# Patient Record
Sex: MALE | Race: BLACK OR AFRICAN AMERICAN | NOT HISPANIC OR LATINO | Employment: FULL TIME | URBAN - METROPOLITAN AREA
[De-identification: names, ages, dates, MRNs, and addresses within clinical notes are randomized per-mention and may not be internally consistent; named-entity substitution may affect disease eponyms.]

---

## 2021-07-05 ENCOUNTER — HOSPITAL ENCOUNTER (EMERGENCY)
Facility: HOSPITAL | Age: 21
Discharge: HOME | End: 2021-07-05
Attending: EMERGENCY MEDICINE
Payer: OTHER GOVERNMENT

## 2021-07-05 VITALS
BODY MASS INDEX: 22.4 KG/M2 | DIASTOLIC BLOOD PRESSURE: 84 MMHG | HEIGHT: 71 IN | WEIGHT: 160 LBS | SYSTOLIC BLOOD PRESSURE: 140 MMHG | RESPIRATION RATE: 18 BRPM | HEART RATE: 60 BPM | TEMPERATURE: 99 F | OXYGEN SATURATION: 100 %

## 2021-07-05 DIAGNOSIS — U07.1 COVID-19: Primary | ICD-10-CM

## 2021-07-05 DIAGNOSIS — R05.9 COUGH: ICD-10-CM

## 2021-07-05 LAB — SARS-COV-2 RDRP RESP QL NAA+PROBE: POSITIVE

## 2021-07-05 PROCEDURE — 99283 EMERGENCY DEPT VISIT LOW MDM: CPT

## 2021-07-05 PROCEDURE — U0002 COVID-19 LAB TEST NON-CDC: HCPCS | Performed by: NURSE PRACTITIONER

## 2021-07-23 ENCOUNTER — HOSPITAL ENCOUNTER (EMERGENCY)
Facility: HOSPITAL | Age: 21
Discharge: HOME | End: 2021-07-23
Attending: EMERGENCY MEDICINE
Payer: OTHER GOVERNMENT

## 2021-07-23 VITALS
RESPIRATION RATE: 17 BRPM | OXYGEN SATURATION: 100 % | TEMPERATURE: 98 F | HEART RATE: 64 BPM | DIASTOLIC BLOOD PRESSURE: 90 MMHG | SYSTOLIC BLOOD PRESSURE: 146 MMHG

## 2021-07-23 DIAGNOSIS — U07.1 LAB TEST POSITIVE FOR DETECTION OF COVID-19 VIRUS: Primary | ICD-10-CM

## 2021-07-23 LAB — SARS-COV-2 RDRP RESP QL NAA+PROBE: POSITIVE

## 2021-07-23 PROCEDURE — U0002 COVID-19 LAB TEST NON-CDC: HCPCS | Performed by: NURSE PRACTITIONER

## 2021-07-23 PROCEDURE — 99282 EMERGENCY DEPT VISIT SF MDM: CPT

## 2023-02-06 ENCOUNTER — HOSPITAL ENCOUNTER (EMERGENCY)
Facility: OTHER | Age: 23
Discharge: HOME | End: 2023-02-06
Attending: EMERGENCY MEDICINE

## 2023-02-06 VITALS
HEART RATE: 58 BPM | TEMPERATURE: 98 F | DIASTOLIC BLOOD PRESSURE: 73 MMHG | SYSTOLIC BLOOD PRESSURE: 128 MMHG | HEIGHT: 71 IN | RESPIRATION RATE: 18 BRPM | WEIGHT: 160 LBS | OXYGEN SATURATION: 98 % | BODY MASS INDEX: 22.4 KG/M2

## 2023-02-06 DIAGNOSIS — S39.012A LUMBAR STRAIN, INITIAL ENCOUNTER: Primary | ICD-10-CM

## 2023-02-06 PROCEDURE — 63600175 PHARM REV CODE 636 W HCPCS: Performed by: EMERGENCY MEDICINE

## 2023-02-06 PROCEDURE — 96372 THER/PROPH/DIAG INJ SC/IM: CPT | Performed by: EMERGENCY MEDICINE

## 2023-02-06 PROCEDURE — 25000003 PHARM REV CODE 250: Performed by: EMERGENCY MEDICINE

## 2023-02-06 PROCEDURE — 99284 EMERGENCY DEPT VISIT MOD MDM: CPT

## 2023-02-06 RX ORDER — ORPHENADRINE CITRATE 100 MG/1
100 TABLET, EXTENDED RELEASE ORAL 2 TIMES DAILY
Qty: 20 TABLET | Refills: 0 | Status: SHIPPED | OUTPATIENT
Start: 2023-02-06 | End: 2023-02-16

## 2023-02-06 RX ORDER — IBUPROFEN 600 MG/1
600 TABLET ORAL EVERY 6 HOURS PRN
Qty: 20 TABLET | Refills: 0 | Status: SHIPPED | OUTPATIENT
Start: 2023-02-06

## 2023-02-06 RX ORDER — DEXAMETHASONE SODIUM PHOSPHATE 4 MG/ML
4 INJECTION, SOLUTION INTRA-ARTICULAR; INTRALESIONAL; INTRAMUSCULAR; INTRAVENOUS; SOFT TISSUE
Status: COMPLETED | OUTPATIENT
Start: 2023-02-06 | End: 2023-02-06

## 2023-02-06 RX ORDER — OXYCODONE AND ACETAMINOPHEN 5; 325 MG/1; MG/1
1 TABLET ORAL
Status: COMPLETED | OUTPATIENT
Start: 2023-02-06 | End: 2023-02-06

## 2023-02-06 RX ORDER — ORPHENADRINE CITRATE 30 MG/ML
30 INJECTION INTRAMUSCULAR; INTRAVENOUS
Status: COMPLETED | OUTPATIENT
Start: 2023-02-06 | End: 2023-02-06

## 2023-02-06 RX ORDER — KETOROLAC TROMETHAMINE 30 MG/ML
30 INJECTION, SOLUTION INTRAMUSCULAR; INTRAVENOUS
Status: COMPLETED | OUTPATIENT
Start: 2023-02-06 | End: 2023-02-06

## 2023-02-06 RX ADMIN — KETOROLAC TROMETHAMINE 30 MG: 30 INJECTION, SOLUTION INTRAMUSCULAR; INTRAVENOUS at 03:02

## 2023-02-06 RX ADMIN — ORPHENADRINE CITRATE 30 MG: 30 INJECTION INTRAMUSCULAR; INTRAVENOUS at 04:02

## 2023-02-06 RX ADMIN — DEXAMETHASONE SODIUM PHOSPHATE 4 MG: 4 INJECTION INTRA-ARTICULAR; INTRALESIONAL; INTRAMUSCULAR; INTRAVENOUS; SOFT TISSUE at 03:02

## 2023-02-06 RX ADMIN — OXYCODONE HYDROCHLORIDE AND ACETAMINOPHEN 1 TABLET: 5; 325 TABLET ORAL at 03:02

## 2023-02-06 NOTE — ED PROVIDER NOTES
"     Source of History:  The patient    Chief complaint:  Back Pain (Reports lower back pain onset while playing basketball last night. Denies injury/trauma. States he felt a shocking sensation and fell to the ground. Reports pain with ambulation. Unable to bend down. )      HPI:  Brad Brandon is a 22 y.o. male presenting with  complains of low back pain that started last night after he would played basketball.  Stated he did not have any specific injury during basketball and denies any fall.  When he was walking home he noticed his back slowly tightening up which progressively worsened.  He is now having difficulty walking secondary to pain.  Denies any loss of bowel or bladder and is urinating fine.  The pain is just an low back and does not radiate down the legs.      This is the extent to the patients complaints today here in the emergency department.    ROS:   See HPI.    Review of patient's allergies indicates:  No Known Allergies    PMH:  As per HPI and below:  No past medical history on file.  No past surgical history on file.    Social History     Tobacco Use    Smoking status: Never   Substance Use Topics    Alcohol use: Yes    Drug use: Not Currently       Physical Exam:    /84 (BP Location: Left arm, Patient Position: Sitting)   Pulse 61   Temp 98.5 °F (36.9 °C) (Oral)   Resp 20   Ht 5' 11" (1.803 m)   Wt 72.6 kg (160 lb)   SpO2 98%   BMI 22.32 kg/m²   Nursing note and vital signs reviewed.  Constitutional: No acute distress.  Nontoxic  Head:  Normocephalic atraumatic  Musculoskeletal:  Bilateral tenderness to palpation of the lumbosacral joint.  No midline tenderness step-offs or deformities of the lumbar thoracic spine.  Patient has a negative straight raise leg test bilaterally.  Skin: No rashes seen.        MDM/ Differential Dx:   The patient's back pain is likely a musculoskeletal strain.  There are no signs of saddle anesthesia, incontinence, neurologic deficits, fevers, trauma or " midline tenderness on history or physical to suggest cauda equina, infectious process, fracture or subluxation.  I will treat with pain medication, anti-inflammatories and muscle relaxers for relief.    ED Course as of 02/06/23 1656 Mon Feb 06, 2023 1654 Upon re-evaluation patient is feeling much better.  Will discharge to follow-up with primary care as needed.      No further workup is indicated in the emergency department today.  I updated pt regarding results and I counseled pt regarding supportive care measures.  Diagnosis and treatment plan explained to patient. I have answered all questions and the patient is satisfied with the plan of care. Patient discharged home in stable condition.  [SM]      ED Course User Index  [SM] Juan Manriquez DO               Diagnostic Impression:    1. Lumbar strain, initial encounter         ED Disposition Condition    Discharge Stable            ED Prescriptions       Medication Sig Dispense Start Date End Date Auth. Provider    orphenadrine (NORFLEX) 100 mg tablet Take 1 tablet (100 mg total) by mouth 2 (two) times daily. for 10 days 20 tablet 2/6/2023 2/16/2023 Juan Manriquez DO    ibuprofen (ADVIL,MOTRIN) 600 MG tablet Take 1 tablet (600 mg total) by mouth every 6 (six) hours as needed for Pain. 20 tablet 2/6/2023 -- Juan Manriquez DO          Follow-up Information       Follow up With Specialties Details Why Contact Info    [facility]   As needed [address] [facility] [address]  [phone]               Juan Manriquez DO  02/06/23 1656

## 2023-02-06 NOTE — ED TRIAGE NOTES
"Pt presents to the ED w/ c/o lower back pain x 1 day after playing basketball. Pt denies injury or trauma to area. Pt states "I felt a sharp pain and then fell." Pt reports taking someone else's muscle relaxer's but cannot remember the name.  "

## 2023-02-06 NOTE — FIRST PROVIDER EVALUATION
Emergency Department TeleTriage Encounter Note      CHIEF COMPLAINT    Chief Complaint   Patient presents with    Back Pain     Reports lower back pain onset while playing basketball last night. Denies injury/trauma. States he felt a shocking sensation and fell to the ground. Reports pain with ambulation. Unable to bend down.        VITAL SIGNS   Initial Vitals [02/06/23 1413]   BP Pulse Resp Temp SpO2   136/84 61 20 98.5 °F (36.9 °C) 98 %      MAP       --            ALLERGIES    Review of patient's allergies indicates:  No Known Allergies    PROVIDER TRIAGE NOTE  Patient presents with complaint of lower back pain for one day. He denied numbness/tingling to legs. He denied loss of control of bowels/bladder. He reports taking motrin and muscle relaxer.       Phy:   Constitutional: well nourished, well developed, appearing stated age, NAD   HEENT: NCAT, symmetrical lids, No obvious facial deformity.  Normal phonation. Normal Conjunctiva   Neck: NAROM   Respiratory: Normal effort.  No obvious use of accessory muscles   Musculoskeletal: Moved upper extremities well   Neuro: Alert, answers questions appropriately    Psych: appropriate mood and affect      Initial orders will be placed and care will be transferred to an alternate provider when patient is roomed for a full evaluation. Any additional orders and the final disposition will be determined by that provider.        ORDERS  Labs Reviewed - No data to display    ED Orders (720h ago, onward)      None              Virtual Visit Note: The provider triage portion of this emergency department evaluation and documentation was performed via [employer], a HIPAA-compliant telemedicine application, in concert with a tele-presenter in the room. A face to face patient evaluation with one of my colleagues will occur once the patient is placed in an emergency department room.      DISCLAIMER: This note was prepared with [employer] voice recognition transcription software.  Garbled syntax, mangled pronouns, and other bizarre constructions may be attributed to that software system.

## 2023-02-06 NOTE — Clinical Note
"Brad"Beth Brandon was seen and treated in our emergency department on 2/6/2023.  He may return to work on 02/08/2023.       If you have any questions or concerns, please don't hesitate to call.      Juan Manriquez, DO"

## 2023-02-09 ENCOUNTER — HOSPITAL ENCOUNTER (EMERGENCY)
Facility: OTHER | Age: 23
Discharge: HOME | End: 2023-02-09
Attending: EMERGENCY MEDICINE

## 2023-02-09 VITALS
RESPIRATION RATE: 17 BRPM | HEART RATE: 57 BPM | OXYGEN SATURATION: 100 % | DIASTOLIC BLOOD PRESSURE: 74 MMHG | WEIGHT: 165 LBS | BODY MASS INDEX: 23.1 KG/M2 | SYSTOLIC BLOOD PRESSURE: 138 MMHG | TEMPERATURE: 98 F | HEIGHT: 71 IN

## 2023-02-09 DIAGNOSIS — M54.50 ACUTE BILATERAL LOW BACK PAIN WITHOUT SCIATICA: Primary | ICD-10-CM

## 2023-02-09 PROCEDURE — 25000003 PHARM REV CODE 250: Performed by: EMERGENCY MEDICINE

## 2023-02-09 PROCEDURE — 63600175 PHARM REV CODE 636 W HCPCS: Performed by: EMERGENCY MEDICINE

## 2023-02-09 PROCEDURE — 96372 THER/PROPH/DIAG INJ SC/IM: CPT | Performed by: EMERGENCY MEDICINE

## 2023-02-09 PROCEDURE — 99284 EMERGENCY DEPT VISIT MOD MDM: CPT

## 2023-02-09 RX ORDER — LIDOCAINE 50 MG/G
1 PATCH TOPICAL ONCE
Status: DISCONTINUED | OUTPATIENT
Start: 2023-02-09 | End: 2023-02-09 | Stop reason: HOSPADM

## 2023-02-09 RX ORDER — METHYLPREDNISOLONE SOD SUCC 125 MG
80 VIAL (EA) INJECTION
Status: COMPLETED | OUTPATIENT
Start: 2023-02-09 | End: 2023-02-09

## 2023-02-09 RX ORDER — KETOROLAC TROMETHAMINE 30 MG/ML
10 INJECTION, SOLUTION INTRAMUSCULAR; INTRAVENOUS
Status: COMPLETED | OUTPATIENT
Start: 2023-02-09 | End: 2023-02-09

## 2023-02-09 RX ORDER — DICLOFENAC SODIUM 10 MG/G
2 GEL TOPICAL 4 TIMES DAILY
Qty: 100 G | Refills: 0 | Status: SHIPPED | OUTPATIENT
Start: 2023-02-09

## 2023-02-09 RX ORDER — METHYLPREDNISOLONE 4 MG/1
TABLET ORAL
Qty: 1 EACH | Refills: 0 | Status: SHIPPED | OUTPATIENT
Start: 2023-02-09 | End: 2023-03-02

## 2023-02-09 RX ORDER — LIDOCAINE 50 MG/G
1 PATCH TOPICAL DAILY
Qty: 15 PATCH | Refills: 0 | Status: SHIPPED | OUTPATIENT
Start: 2023-02-09

## 2023-02-09 RX ADMIN — METHYLPREDNISOLONE SODIUM SUCCINATE 80 MG: 125 INJECTION, POWDER, FOR SOLUTION INTRAMUSCULAR; INTRAVENOUS at 05:02

## 2023-02-09 RX ADMIN — KETOROLAC TROMETHAMINE 10 MG: 30 INJECTION, SOLUTION INTRAMUSCULAR; INTRAVENOUS at 05:02

## 2023-02-09 RX ADMIN — LIDOCAINE 1 PATCH: 50 PATCH CUTANEOUS at 05:02

## 2023-02-09 NOTE — ED PROVIDER NOTES
Encounter Date: 2/9/2023    SCRIBE #1 NOTE: I, Girish Lopez, am scribing for, and in the presence of,  Mat Emmanuel MD. I have scribed the following portions of the note - Other sections scribed: HPI, ROS.     History     Chief Complaint   Patient presents with    Back Pain     Pt reporting lower back pain x 5 days; pt seen in ED on Monday, sent home with rx of ibuprofen and muscle relaxer which pt has been taking without relief     Time seen by provider: 5:05 PM    This is a 22 y.o. male who presents with complaint of atypical lower back pain since 5 days ago. Patient reports that he visited the ED 3 days ago. He states that they prescribed him Norflex and Ibuprofen, which he takes with no relief. Patient notes that the symptoms are worsening. He reports intermittent back spasms that occur upon exertion and laying down. He states that the spasms are worse in the morning upon waking up. Patient reports associated leg weakness and falls due to the spasms. He denies any history of back issues. No PMHx of Shx. Patient denies taking any prescribed medications. He has no known allergies. Patient notes that he works at the [employer].   This is the extent of the patient's complaints at this time.      The history is provided by the patient.   Review of patient's allergies indicates:  No Known Allergies  No past medical history on file.  No past surgical history on file.  No family history on file.  Social History     Tobacco Use    Smoking status: Never   Substance Use Topics    Alcohol use: Yes    Drug use: Not Currently     Review of Systems  Constitutional-no fever  HEENT-no congestion  Eyes-no redness  Respiratory-no shortness of breath  Cardio-no chest pain  GI-no abdominal pain  Endocrine-no cold intolerance  -no difficulty urinating  MSK-notes back pain  Skin-no rashes  Allergy-no environmental allergy  Neurologic-notes weakness, no headache  Hematology-no swollen nodes  Behavioral-no  confusion    Physical Exam     Initial Vitals [02/09/23 1624]   BP Pulse Resp Temp SpO2   (!) 140/75 69 20 97.4 °F (36.3 °C) 99 %      MAP       --         Physical Exam  Constitutional:  Uncomfortable appearing man in mild distress  Eyes: Conjunctivae normal.  ENT       Head: Normocephalic, atraumatic.       Nose: Normal external appearance        Mouth/Throat: no strigulous respirations   Hematological/Lymphatic/Immunilogical: no visible lymphadenopathy   Cardiovascular: Normal rate,   Respiratory: Normal respiratory effort.   Gastrointestinal: non distended   Musculoskeletal: Normal range of motion in all extremities.  positive straight leg raise test on the right  Moderate tenderness palpation the paraspinal muscles lumbar spine most prominent over the right SI joint  Neurologic: Alert, oriented. Normal speech and language. No gross focal neurologic deficits are appreciated.  Skin: Skin is warm, dry. No rash noted.  Psychiatric: Mood and affect are normal.   ED Course   Procedures  Labs Reviewed - No data to display       Imaging Results              X-Ray Lumbar Spine Ap And Lateral (Final result)  Result time 02/09/23 18:04:48      Final result by Sophia Randall MD (02/09/23 18:04:48)                   Impression:      No acute lumbar spine abnormalities identified.      Electronically signed by: Sophia Randall MD  Date:    02/09/2023  Time:    18:04               Narrative:    EXAMINATION:  XR LUMBAR SPINE AP AND LATERAL    CLINICAL HISTORY:  pain;    TECHNIQUE:  AP, lateral and spot images were performed of the lumbar spine.    COMPARISON:  None    FINDINGS:  Lumbar spine alignment is within normal limits.  No evidence of acute lumbar spine fracture or subluxation.  Mild intervertebral disc space narrowing is visualized at the L5-S1.  Remaining intervertebral disc spaces appear fairly well maintained.  Visualized sacrum is unremarkable.                                       Medications   ketorolac  injection 9.999 mg (9.999 mg Intramuscular Given 2/9/23 1719)   methylPREDNISolone sodium succinate injection 80 mg (80 mg Intramuscular Given 2/9/23 1719)     Medical Decision Making:   History:   Old Medical Records: I decided to obtain old medical records.  Old Records Summarized: records from clinic visits.  Differential Diagnosis:   Low back pain is a profoundly broad differential including benign back strains and spasms to serious structural issues such as cauda equina syndrome, prolapsed disc, epidural abscess or pathologic fractures even to back pain mimics like pyelonephritis, AAA or ischemic bowel.    This patient does not have significant fever, no neurologic impairment to suggest compressive lesion such as epidural abscess or cauda equina syndrome.   Reflexes are appropriate and at baseline.     The differential was considered and the appropriate diagnostics were ordered therefore with a disposition determination in line with the most likely underlying cause.   Independently Interpreted Test(s):   I have ordered and independently interpreted X-rays - see summary below.       <> Summary of X-Ray Reading(s): No obvious fracture or malalignment in the lumbar spine  Clinical Tests:   Radiological Study: Ordered and Reviewed        Scribe Attestation:   Scribe #1: I performed the above scribed service and the documentation accurately describes the services I performed. I attest to the accuracy of the note.            Physician Attestation for Scribe: I, ryann ruvalcaba, reviewed documentation as scribed in my presence, which is both accurate and complete.       Clinical Impression:   Final diagnoses:  [M54.50] Acute bilateral low back pain without sciatica (Primary)        ED Disposition Condition    Discharge Stable          ED Prescriptions       Medication Sig Dispense Start Date End Date Auth. Provider    diclofenac sodium (VOLTAREN) 1 % Gel Apply 2 g topically 4 (four) times daily. 100 g 2/9/2023 --  Mat Emmanuel MD    LIDOcaine (LIDODERM) 5 % Place 1 patch onto the skin once daily. Remove & Discard patch within 12 hours or as directed by MD 15 patch 2/9/2023 -- Mat Emmanuel MD    methylPREDNISolone (MEDROL DOSEPACK) 4 mg tablet Take as package directs 1 each 2/9/2023 3/2/2023 Mat Emmanuel MD          Follow-up Information       Follow up With Specialties Details Why Contact Info Additional Information    [facility] Schedule an appointment as soon as possible for a visit  As needed, For a follow up visit about today [address], [address]  [place] [address]  [phone] [facility] - [facility], 4th Floor Please park in [address] and use [place] elevators             Mat Emmanuel MD  02/10/23 8998

## 2023-02-09 NOTE — ED TRIAGE NOTES
Pt states he has back pain, pt states he was seen here Monday and given medications for it but they didn't help. Pt is alert and oriented, ambulatory, respirations are even unlabored. Pt is in NAD

## 2023-02-09 NOTE — Clinical Note
"Brad"Beth Brandon was seen and treated in our emergency department on 2/9/2023.  He may return to work on 02/13/2023.       If you have any questions or concerns, please don't hesitate to call.      Mat Emmanuel MD"

## 2023-02-09 NOTE — FIRST PROVIDER EVALUATION
Emergency Department TeleTriage Encounter Note      CHIEF COMPLAINT    Chief Complaint   Patient presents with    Back Pain     Pt reporting lower back pain x 5 days; pt seen in ED on Monday, sent home with rx of ibuprofen and muscle relaxer which pt has been taking without relief       VITAL SIGNS   Initial Vitals [02/09/23 1624]   BP Pulse Resp Temp SpO2   (!) 140/75 69 20 97.4 °F (36.3 °C) 99 %      MAP       --            ALLERGIES    Review of patient's allergies indicates:  No Known Allergies    PROVIDER TRIAGE NOTE  This is a teletriage evaluation of a 22 y.o. male presenting to the ED with c/o lower back pain since Sunday.  Pt was seen on Monday and prescribed pain medication.  Pt states he has been taking medication with no relief.  Pt states he feels like his legs are week.       PE: VSS.  Steady gait appreciated.      Plan: monitor    Limited physical exam via telehealth: The patient is awake, alert, answering questions appropriately and is not in respiratory distress. All ED beds are full at present; patient notified of this status.  Patient seen and medically screened by Nurse Practitioner via teletriage.      Initial orders will be placed and care will be transferred to an alternate provider when patient is roomed for a full evaluation. Any additional orders and the final disposition will be determined by that provider.         ORDERS  Labs Reviewed - No data to display    ED Orders (720h ago, onward)      None              Virtual Visit Note: The provider triage portion of this emergency department evaluation and documentation was performed via [employer], a HIPAA-compliant telemedicine application, in concert with a tele-presenter in the room. A face to face patient evaluation with one of my colleagues will occur once the patient is placed in an emergency department room.      DISCLAIMER: This note was prepared with [employer] voice recognition transcription software. Garbled syntax, mangled  pronouns, and other bizarre constructions may be attributed to that software system.

## 2023-02-10 NOTE — DISCHARGE INSTRUCTIONS
Your x-ray looks normal, use the medications prescribed to assist with your pain.    Schedule appointment with a back doctor as needed for further evaluation.    Mr. Brandon,    Thank you for letting me care for you today! It was nice meeting you, and I hope you feel better soon.   If you would like access to your chart and what was done today please utilize the [facility].   Please come back to [facility] for all of your future medical needs.    Our goal in the emergency department is to always give you outstanding care and exceptional service. You may receive a survey by mail or e-mail in the next week regarding your experience in our ED. We would greatly appreciate you completing and returning the survey. Your feedback provides us with a way to recognize our staff who give very good care and it helps us learn how to improve when your experience was below our aspiration of excellence.     Sincerely,    Mat Emmanuel MD  Board Certified Emergency Physician

## 2023-02-14 ENCOUNTER — TELEPHONE (OUTPATIENT)
Dept: ADMINISTRATIVE | Facility: OTHER | Age: 23
End: 2023-02-14